# Patient Record
(demographics unavailable — no encounter records)

---

## 2024-10-22 NOTE — HISTORY OF PRESENT ILLNESS
[FreeTextEntry1] : Follow up. [de-identified] : Patient is a 66yo female with PMH HLD, hypothyroidism, paroxysmal atrial tachycardia, antiphospholipid Ab, MGUS, goiter, osteopenia, anxiety who presents to the office for follow up.   HLD:  , HDL 49; ASCVD risk 4.31% in 04/2024.  Lifestyle modifications recommended.  Cardiology:  Dr. Chavez.  Hypothyroidism:  TSH slightly low, FT4 normal in 04/2024.  Pt felt well at that time so stayed on her regular dose of Levothyroxine 88mcg daily.  States thyroid labs were still off so Dr. Jay just started her on Levothyroxine 88mcg daily except Sundays takes 1/2 dose.  Pt scheduled for rpt TFTs in 3 months. Endocrinology:  Dr. Jay.   Heme/onc:  Dr. Lawton.  Follows for MGUS/APLS.   Pt was seen in the ED about 1.5 months ago for severe abdominal pain.  Work up negative except for Norovirus.  Pt following with Dr. Hobson, found to have fatty liver on US, getting Fibroscan.  Pt reports right shoulder pain, ongoing for the past year or so intermittently.  No known trauma/injury to the area.

## 2024-10-22 NOTE — ASSESSMENT
[FreeTextEntry1] : Patient is a 68yo female with PMH HLD, hypothyroidism, paroxysmal atrial tachycardia, antiphospholipid Ab, MGUS, goiter, osteopenia, anxiety who presents to the office for follow up.  HLD - Fasting labs drawn in office. - Limit/avoid greasy foods, fried foods, fatty foods, and saturated fat in your diet and try and eat more lean proteins. - Will forward results to Dr. Chavez.  Hypothyroidism - Levothyroxine dose switched within the week to 88mcg daily except Sundays takes 1/2 tablet. - Has rpt b/w scheduled with Dr. Jay in ~3 months so will defer rpt TFT testing at our office today.  Anxiety - Feels well/stable on current medication regimen. - Continue Lexapro 5mg daily. - Alert office if symptoms worsen.  Right shoulder pain - RX for PT provided. - Supportive care including rest, gentle stretching, Ibuprofen/Acetaminophen PRN. - +/- imaging, ortho referral if symptoms persist/worsen despite above.  Call the office or go to the ED immediately if you develop new, worsening or concerning symptoms including high fever, severe headache/worst headache of your life, confusion, dizziness/lightheadedness, loss of consciousness, severe chest pain, difficulty breathing, shortness of breath, severe abdominal pain, excessive vomiting/diarrhea, inability to feel/move the extremities, or any other concerning symptoms.

## 2024-10-22 NOTE — HEALTH RISK ASSESSMENT
[No] : In the past 12 months have you used drugs other than those required for medical reasons? No [No falls in past year] : Patient reported no falls in the past year [0] : 2) Feeling down, depressed, or hopeless: Not at all (0) [PHQ-2 Negative - No further assessment needed] : PHQ-2 Negative - No further assessment needed [Never] : Never [Audit-CScore] : 0 [de-identified] : walks, light weight training [de-identified] : well balanced [[id number]] : 0

## 2025-06-30 NOTE — ASSESSMENT
[Vaccines Reviewed] : Immunizations reviewed today. Please see immunization details in the vaccine log within the immunization flowsheet.  [FreeTextEntry1] : CARIE ZHU is a 67 year old female here for a physical exam.  She is also here to follow up on medical issues as noted above.  Carie has high cholesterol, hypothyroidism (s/p I131 treatment of hyperthyroidism/Graves disease), osteopenia and microscopic hematuria (with incidental left AML lesion noted on workup for same), MGUS (Dx in 2024) and possible antiphospholipid Ab syndrome (has +antibodies though no clinical sxs). She also had syncope event (?vasovagal vs. related to underlying dysrhythmia) in 2022 and underwent card eval for this (see prior notes; has LINQ in place). 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 Chavez (card), Dr. Emerson (rheum, and was advised no need for routine f/u with rheum), Dr. Lawton (H/O), and Dr. Perez (urol, Dr. Jay (endo), Yris Wright (GI)   She is UTD on f/u with Dr. Lawton (hematology). She was confirmed to have presence of antiphospholipid Ab though as she has no signif h/o significant thrombotic event she does not meet strict criteria for aniphospholipid Ab syndrome and she was advised does not need ASA/anticoagulation med for primary thrombosis prevention. She has MGUS based on presence of IgG lambda monoclonal Ab. Has f/u scheduled July 2025 with Dr. Lawton  She had updated eval with Dr. Perez in 2024 re her microhematuria and AML kidney. Has had eval several times in the past and always benign and wnl except for incidental finding of left kidney AML. No gross hematuria.   F/u renal US in 8/2024 showed LEFT (stated in body of report) vs RIGHT (started in impression of report) kidney AML (1.5 cm in size, states increased from prior ) and subcentimeter right renal cyst. Recommended she f/u With Dr. Perez re clarification of side of AML and re if addtl imaging needed vs f/u US and at what interval.   DID SHE MAKE plan re f/u AML (and get Dr. Perez to review film to clarify side?)  She had updated eval with Dr. Chavez in 2024 and he felt still reasonable for her to defer on statin therapy for now given coronary calcium score of zero (8/2022) and low 10 yr ASCVD risk est level. He recommended updated carotid US. She recalls it was fine (Dec 2024)   At her Oct 2024 visit labs notable for HDL 49 (49),  (147).   Her 10 year ASCVD risk is calculated at 6.48%. This is lower risk range and so statin therapy is not indicated at this time based on these numbers though we disc in light of her fatty liver  could consider adding statin med (can try low dose initially as she is concerned re poss SE as mom was never able to tolerate statins) . Recommend Mediterranean style plant based eating and regular exercise +/- can consider use of psyllium husk fiber supplement (eg Metamucil etc).   5 minutes was spent on this discussion and assessment with the patient.  She had norovirus Fall 2024. Imaging incidentally noted fatty liver. Saw Dr. Hobson. Fibroscan showed no fibrosis, moderate steatosis. eats pretty cleanly and is trying to tweak. No alcohol intake GM had cirrhosis and was not a drinker; likely was fatty liver related. Also knows of other family members with fatty liver . Disc option to add statin to try to helo with NAFLD as she is otherwise doing most of what she needs to do for good liver health and in light of FH and she is willing to try

## 2025-06-30 NOTE — HEALTH RISK ASSESSMENT
[No falls in past year] : Patient reported no falls in the past year [0] : 2) Feeling down, depressed, or hopeless: Not at all (0) [PHQ-2 Negative - No further assessment needed] : PHQ-2 Negative - No further assessment needed [Never] : Never [[id number]] : 0

## 2025-06-30 NOTE — PLAN
[FreeTextEntry1] : Continue all medications as prescribed (she is agreeable to trial of low dose statin if LDL elevated , in light of Fatty liver) . Check labs as above. Will adjust any medications based upon lab results.  Reviewed age-appropriate preventive screening tests with patient. UTD on gyn exam, mammogram, DEXA and colonoscopy screening. Next Colonoscopy due by end of this year (2025) and she will schedule with Dr. Hobson  Reviewed/recommended annual flu vaccine.  She sees a cardiologist regularly and does not need an EKG today.  BP goal is <=135/85 on average on home checks. Advised to let us know if BPs are above goal on home checks.  Lifestyle measures to optimize BP reviewed, including regular exercise, adequate sleep, Mediterranean or DASH style clean eating, mindfulness/meditation, magnesium 100-400 mg supplementation, avoid NSAIDS, stress management techniques etc.  LDL goal <=100 ideally. Reviewed LDL goal and ASCVD risk estimate and factors that go into this calculation. Reviewed risks/benefits of statin med. Reviewed red yeast rice RYR (600-1200 mg daily) risks/benefits as an alternative to statin meds if not ready to consider statin meds. Recommended excellent hydration +/- co Q10 (100-400 mg daily) to decrease risk for statin (or RYR) related myalgias.  Based on most recent LDL and 10 yr ASCVD risk estimate levels statin therapy is not indicated at this time, unless otherwise advised by cardiology. Can consider use of psyllium husk fiber supplement. She is willing to start statin med if LDL is again elevated    Reviewed fatty liver causes and potential complications. Reviewed that eating cleanly (e.g. Mediterranean plant based eating), weight loss, regular exercise, avoidance of processed foods and sweetened beverages, avoidance of alcohol, keeping cholesterol, glucose and A1C levels optimized will all help to improve fatty liver and reduce risk for complications of fatty liver including fibrosis and cirrhosis. Follow up with GI as recommended by them. Hydrate very well (64+ oz water per day), limit/avoid cardiac irritants (eg caffeine, alcohol, oral decongestants etc), stress reduction measures, consider magnesium supplementation (100-400 mg daily) to decrease palpitations.  Avoid prolonged inactivity, exercise regularly, do calf pump exercises, wear compression socks, stay well hydrated to minimize risk for thrombotic event.  F/u with urol as recommended by urol for periodic updated microscopic hematuria eval/testing.  Discussed clean eating (eg Mediterranean style plant focused whole food eating plan) and regular exercise/staying as physically active as possible. Include balance exercises and strength training and core strengthening exercises for bone health and to decrease risk for falls.  Recommended calcium 3117-8152 mg daily ideally mainly or fully from food sources +/- supplement if needed, vit D 1740-8349 IU daily with food that contains fat for better vitamin absorption. Recommended regular weight bearing exercise as well as strength training exercise 2-3+ times per week and balance exercises regularly.  Reviewed importance of good self care (e.g. meditation, yoga, adequate rest, regular exercise, magnesium, clean eating, etc.).  Follow up with specialists as recommended by them. Asked her to have any/all specialists outside [facility] network send consult notes/test results etc to keep me informed.   Follow up for next physical in one year. Schedule 6 months RPA visit (HTN, chol etc) and repeat fasting labs at that time.

## 2025-06-30 NOTE — REVIEW OF SYSTEMS
[Joint Pain] : joint pain [Joint Stiffness] : joint stiffness [Negative] : Heme/Lymph [Dysuria] : no dysuria [Hematuria] : no hematuria [FreeTextEntry8] : +h/o microhematuria, never any gross healturia, UTD on urol f/u  [FreeTextEntry9] : OA related joint pain and stiffness that is manageable [de-identified] : Incr stress in past year, despite this has been feeling well emotionally and the 5 mg dose of Escitalopram is well tolerated  and effective and wants to cont same dose

## 2025-06-30 NOTE — HISTORY OF PRESENT ILLNESS
[FreeTextEntry1] : MARIUM ZHU is a 67 year old female here for a physical exam.  [de-identified] : Her last physical exam was last year  Vaccines: Tetanus is up to date, Tdap 7/2019 Pneumococcal vaccination is up to date Shingrix is up to date  Her last dentist visit was within past 6-12 months Her last eye doctor appointment was less than one year ago Her last dermatologist visit was less than one year ago  GYN visit is up to date, Fall 2024 Dr. Grey wnl Mammogram is up to date, Fall 2024 stable/benign, [facility] Colon cancer screening is up to date, colonoscopy late 2020, 5yr f/u recommended (Dr. Hobson). Had norovirus infection Fall 2024.  DEXA is up to date, Fall 2024 worsening osteopenia, no rx meds needed yet per Endo. Added resistance training 2-3 d/wk   Her diet is healthy overall Exercise: regular walking, also wgt/resistance training 2-3 d/wk   Needs PPD for work ([employer] not covered on her insurance)